# Patient Record
Sex: FEMALE | URBAN - METROPOLITAN AREA
[De-identification: names, ages, dates, MRNs, and addresses within clinical notes are randomized per-mention and may not be internally consistent; named-entity substitution may affect disease eponyms.]

---

## 2023-11-08 ENCOUNTER — LAB OUTSIDE AN ENCOUNTER (OUTPATIENT)
Dept: URBAN - METROPOLITAN AREA CLINIC 113 | Facility: CLINIC | Age: 58
End: 2023-11-08

## 2023-11-08 ENCOUNTER — OFFICE VISIT (OUTPATIENT)
Dept: URBAN - METROPOLITAN AREA CLINIC 113 | Facility: CLINIC | Age: 58
End: 2023-11-08
Payer: COMMERCIAL

## 2023-11-08 ENCOUNTER — DASHBOARD ENCOUNTERS (OUTPATIENT)
Age: 58
End: 2023-11-08

## 2023-11-08 VITALS
HEART RATE: 55 BPM | WEIGHT: 229 LBS | DIASTOLIC BLOOD PRESSURE: 69 MMHG | SYSTOLIC BLOOD PRESSURE: 103 MMHG | BODY MASS INDEX: 40.56 KG/M2 | TEMPERATURE: 97.7 F

## 2023-11-08 DIAGNOSIS — R13.14 PHARYNGOESOPHAGEAL DYSPHAGIA: ICD-10-CM

## 2023-11-08 DIAGNOSIS — Z86.010 HISTORY OF ADENOMATOUS POLYP OF COLON: ICD-10-CM

## 2023-11-08 DIAGNOSIS — K22.70 BARRETT'S ESOPHAGUS WITHOUT DYSPLASIA: ICD-10-CM

## 2023-11-08 DIAGNOSIS — K21.9 GERD WITHOUT ESOPHAGITIS: ICD-10-CM

## 2023-11-08 PROBLEM — 40739000: Status: ACTIVE | Noted: 2023-11-08

## 2023-11-08 PROBLEM — 429047008: Status: ACTIVE | Noted: 2023-11-08

## 2023-11-08 PROBLEM — 266435005: Status: ACTIVE | Noted: 2023-11-08

## 2023-11-08 PROBLEM — 302914006: Status: ACTIVE | Noted: 2023-11-08

## 2023-11-08 PROCEDURE — 99204 OFFICE O/P NEW MOD 45 MIN: CPT | Performed by: NURSE PRACTITIONER

## 2023-11-08 RX ORDER — FOLIC ACID 1 MG/1
1 TABLET TABLET ORAL ONCE A DAY
Status: ACTIVE | COMMUNITY

## 2023-11-08 RX ORDER — L.ACID,FERM,PLA,RHA/B.BIF,LONG 126 MG
AS DIRECTED TABLET, DELAYED AND EXTENDED RELEASE ORAL
Status: ACTIVE | COMMUNITY

## 2023-11-08 RX ORDER — HYDROCHLOROTHIAZIDE 12.5 MG/1
1 CAPSULE IN THE MORNING CAPSULE, GELATIN COATED ORAL ONCE A DAY
Status: ACTIVE | COMMUNITY

## 2023-11-08 RX ORDER — MELOXICAM 15 MG/1
1 TABLET TABLET ORAL ONCE A DAY
Status: ACTIVE | COMMUNITY

## 2023-11-08 RX ORDER — MECOBALAMIN 5000 MCG
AS DIRECTED LOZENGE ORAL
Status: ACTIVE | COMMUNITY

## 2023-11-08 RX ORDER — PANTOPRAZOLE SODIUM 40 MG/1
1 TABLET TABLET, DELAYED RELEASE ORAL ONCE A DAY
Status: ACTIVE | COMMUNITY

## 2023-11-08 RX ORDER — GABAPENTIN 300 MG/1
1 CAPSULE CAPSULE ORAL ONCE A DAY
Status: ACTIVE | COMMUNITY

## 2023-11-08 RX ORDER — SODIUM, POTASSIUM,MAG SULFATES 17.5-3.13G
354ML SOLUTION, RECONSTITUTED, ORAL ORAL
Qty: 354 MILLILITER | Refills: 0 | OUTPATIENT
Start: 2023-11-08 | End: 2023-11-09

## 2023-11-08 RX ORDER — MONTELUKAST 10 MG/1
1 TABLET TABLET, FILM COATED ORAL ONCE A DAY
Status: ACTIVE | COMMUNITY

## 2023-11-08 RX ORDER — CHOLECALCIFEROL (VITAMIN D3) 125 MCG
1 TABLET TABLET ORAL ONCE A DAY
Status: ACTIVE | COMMUNITY

## 2023-11-08 NOTE — HPI-TODAY'S VISIT:
This is a 58-year-old female with a history of anxiety, coronary artery calcification on CT, hyperlipidemia, sleep apnea, and GERD referred from Dr. Lancaster for colon cancer screening. She reports a history of acid reflux and Diaz's esophagus.  She is currently taking pantoprazole 40 mg daily.  Her last EGD was in 2021.  She has experienced recent diet dependent nocturnal heartburn typically associated with eating red sauce.  This has improved with dietary modification.  However she reports some difficulty swallowing.  She feels as though she has to "push a ball" through her throat in order to swallow saliva and occasionally food.  She denies material lodging in her esophagus.  She has not experienced daytime symptoms of acid reflux.  Lately, twice a week, after taking medications, she has experienced mild nausea without vomiting.  She denies abdominal pain.  She is having regular bowel movements without red blood or melena.  She has a history of adenomatous colon polyps.  After her procedure in 2021, she was informed that she needed to repeat her colonoscopy in a year.She has gained 65 pounds in the last 2-1/2 years.  She has recently been diagnosed with elevated cholesterol and has been prescribed medication which she has not started.

## 2023-11-08 NOTE — HPI-OTHER HISTORIES
EGD 6/23/2021: Irregular Z-line status postbiopsy, hiatal hernia, normal examined stomach and duodenum status postbiopsy. Duodenal biopsies demonstrated no significant abnormality. Antral biopsies demonstrated no significant abnormality and no H. pylori. Distal esophageal biopsies demonstrated Diaz's mucosa without dysplasia. Colonoscopy 6/23/2021: Piecemeal removal of a 5 mm ascending polyp, removal of a 2 mm sigmoid polyp, normal terminal ileum, pancolonic diverticulosis. Pathology: Sigmoid polyp was hyperplastic and ascending polyp was a tubular adenoma.

## 2023-11-17 ENCOUNTER — TELEPHONE ENCOUNTER (OUTPATIENT)
Dept: URBAN - METROPOLITAN AREA CLINIC 113 | Facility: CLINIC | Age: 58
End: 2023-11-17

## 2023-12-15 ENCOUNTER — OFFICE VISIT (OUTPATIENT)
Dept: URBAN - METROPOLITAN AREA SURGERY CENTER 12 | Facility: SURGERY CENTER | Age: 58
End: 2023-12-15

## 2024-01-22 ENCOUNTER — OFFICE VISIT (OUTPATIENT)
Dept: URBAN - METROPOLITAN AREA MEDICAL CENTER 2 | Facility: MEDICAL CENTER | Age: 59
End: 2024-01-22

## 2024-02-26 ENCOUNTER — OV EP (OUTPATIENT)
Dept: URBAN - METROPOLITAN AREA CLINIC 107 | Facility: CLINIC | Age: 59
End: 2024-02-26
